# Patient Record
(demographics unavailable — no encounter records)

---

## 2025-01-09 NOTE — HISTORY OF PRESENT ILLNESS
[FreeTextEntry1] : Radha is feeling well. She was rejected Zepbound. No HA, lightheadedness, or dizziness. She has been unable to loose weight. Now trying intermittent fasting without success.
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
## 2025-05-11 NOTE — REVIEW OF SYSTEMS
Please follow up with your doctor in 1 weeks or earlier if your symptoms do not resolve. If you are not able to see your doctor or have any other concerns please come back to the ED right away.        Fall with Uncertain Cause  You have had a fall today. But the cause of your fall is not certain. Falls can happen due to slipping, tripping or losing your balance. A fall can also happen from a fainting spell or seizure.  While a fall can happen for a simple reason (tripping over something), falls in elderly people are often caused by a combination of things:  · Age-related decline in function with worsening balance, stability, vision, and muscle strength  · Chronic illness, such as heart arrhythmias, heart valve disease, vascular disease, COPD, diabetes, strokes, or arthritis  · Shoes that do not give much support and make you prone to slip or slide  · Anemia or low blood pressure   · Effects or side effects of medicines  · Dehydration or recent use of alcohol  · Environmental hazards, such as uneven or slippery ground, unfamiliar place, obstacles, uneven surfaces, or slippery ground  · Situational factors (related to the activity being done, such as rushing to the bathroom)  Because the cause of your fall today is not certain, it is possible that a fainting spell or seizure was the cause. This means that it could happen again, without warning. If you fall again, without a cause, then you should return to this facility promptly to have further tests. Otherwise, follow up with your healthcare provider as explained below.  It is normal to feel sore and tight in your muscles and back the next day, and not just the muscles you initially injured. Remember, all the parts of your body are connected, so while initially one area hurts, the next day another may hurt. Also, when you injure yourself, it causes inflammation, which then causes the muscles to tighten up and hurt more. After the initial worsening, it should  gradually improve over the next few days. However, more severe pain should be reported.  Even without a definite head injury, you can still get a concussion. Concussions and even bleeding can still happen, especially if you have had a recent injury or take blood thinner medicine. It is not unusual to have a mild headache and feel tired and even nauseous or dizzy.  Home care  · Rest today and resume your normal activities as soon as you are feeling back to normal. It is best to remain with someone who can check on you for the next 24 hours to watch for another episode of falling.  · If you were injured during the fall, follow the advice from your healthcare provider regarding care of your injury.  · If you become lightheaded or dizzy, lie down right away or sit and lean forward with your head down.  · As a precaution, don't drive a car or operate dangerous equipment, don't take a bath alone (use a shower instead), and don't swim alone until you see your healthcare provider. A condition causing fainting or seizures must be ruled out before resuming these activities.  · You may use acetaminophen or ibuprofen to control pain, unless another pain medicine was prescribed. If you have chronic liver or kidney disease or ever had a stomach ulcer or gastrointestinal bleeding, talk with your healthcare provider before using these medicines.  · Keep your appointments for any further testing that may have been scheduled for you.  Follow-up care  Follow up with your healthcare provider, or as advised.  If X-rays or CT scan were done, you will be notified if there is a change in the reading, especially if it affects treatment.  Call 911  Call 911 if any of these happen:  · Trouble breathing  · Confused or difficulty arousing  · Fainting or loss of consciousness  · Rapid or very slow heart rate  · Seizure  · Difficulty with speech or vision, weakness of an arm or leg  · Difficulty walking or talking, loss of balance, numbness or  weakness in one side of your body, facial droop  When to seek medical advice  Call your healthcare provider right away if any of these happen:  · Another unexplained fall  · Dizziness  · Severe headache  · Nausea and vomiting  · Blood in vomit, stools (black or red color)  Date Last Reviewed: 11/1/2017  © 1191-6790 [employer]. [address] [place] [address], [place] [address]. All rights reserved. This information is not intended as a substitute for professional medical care. Always follow your healthcare professional's instructions.         [Weight Gain (___ Lbs)] : [unfilled] ~Ulb weight gain [Negative] : Heme/Lymph
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
